# Patient Record
Sex: FEMALE | Race: BLACK OR AFRICAN AMERICAN | NOT HISPANIC OR LATINO | Employment: FULL TIME | URBAN - METROPOLITAN AREA
[De-identification: names, ages, dates, MRNs, and addresses within clinical notes are randomized per-mention and may not be internally consistent; named-entity substitution may affect disease eponyms.]

---

## 2023-12-14 ENCOUNTER — OFFICE VISIT (OUTPATIENT)
Dept: OBSTETRICS AND GYNECOLOGY | Facility: CLINIC | Age: 43
End: 2023-12-14
Payer: COMMERCIAL

## 2023-12-14 VITALS
HEART RATE: 86 BPM | DIASTOLIC BLOOD PRESSURE: 82 MMHG | WEIGHT: 211.8 LBS | HEIGHT: 64 IN | SYSTOLIC BLOOD PRESSURE: 119 MMHG | BODY MASS INDEX: 36.16 KG/M2

## 2023-12-14 DIAGNOSIS — Z11.3 SCREENING FOR STDS (SEXUALLY TRANSMITTED DISEASES): ICD-10-CM

## 2023-12-14 DIAGNOSIS — Z01.419 VISIT FOR GYNECOLOGIC EXAMINATION: Primary | ICD-10-CM

## 2023-12-14 DIAGNOSIS — Z12.4 CERVICAL CANCER SCREENING: ICD-10-CM

## 2023-12-14 PROCEDURE — 1036F TOBACCO NON-USER: CPT | Performed by: ADVANCED PRACTICE MIDWIFE

## 2023-12-14 PROCEDURE — 88175 CYTOPATH C/V AUTO FLUID REDO: CPT | Mod: TC | Performed by: ADVANCED PRACTICE MIDWIFE

## 2023-12-14 PROCEDURE — 99396 PREV VISIT EST AGE 40-64: CPT | Performed by: ADVANCED PRACTICE MIDWIFE

## 2023-12-14 PROCEDURE — 87661 TRICHOMONAS VAGINALIS AMPLIF: CPT | Mod: 59 | Performed by: ADVANCED PRACTICE MIDWIFE

## 2023-12-14 PROCEDURE — 87624 HPV HI-RISK TYP POOLED RSLT: CPT | Performed by: ADVANCED PRACTICE MIDWIFE

## 2023-12-14 PROCEDURE — 87800 DETECT AGNT MULT DNA DIREC: CPT | Performed by: ADVANCED PRACTICE MIDWIFE

## 2023-12-14 RX ORDER — FERROUS SULFATE, DRIED 160(50) MG
1 TABLET, EXTENDED RELEASE ORAL DAILY
COMMUNITY

## 2023-12-14 RX ORDER — MULTIVIT-MIN/IRON/FOLIC ACID/K 18-600-40
CAPSULE ORAL
COMMUNITY
Start: 2020-06-22 | End: 2023-12-14 | Stop reason: ALTCHOICE

## 2023-12-14 RX ORDER — UBIDECARENONE 75 MG
500 CAPSULE ORAL DAILY
COMMUNITY

## 2023-12-14 RX ORDER — BISMUTH SUBSALICYLATE 262 MG
1 TABLET,CHEWABLE ORAL DAILY
COMMUNITY

## 2023-12-14 RX ORDER — ALBUTEROL SULFATE 0.83 MG/ML
SOLUTION RESPIRATORY (INHALATION)
COMMUNITY
Start: 2015-09-02 | End: 2024-04-26 | Stop reason: ALTCHOICE

## 2023-12-14 ASSESSMENT — PATIENT HEALTH QUESTIONNAIRE - PHQ9
SUM OF ALL RESPONSES TO PHQ9 QUESTIONS 1 AND 2: 0
1. LITTLE INTEREST OR PLEASURE IN DOING THINGS: NOT AT ALL
2. FEELING DOWN, DEPRESSED OR HOPELESS: NOT AT ALL

## 2023-12-14 ASSESSMENT — PAIN SCALES - GENERAL: PAINLEVEL: 0-NO PAIN

## 2023-12-14 NOTE — PROGRESS NOTES
"Subjective   Quincy Dolan is a 43 y.o. female who is here for a routine exam. Periods are regular every 28-30 days, lasting 4 days. Dysmenorrhea:none. Cyclic symptoms include none. No intermenstrual bleeding, spotting, or discharge.    Current contraception: none  History of abnormal Pap smear: no  Family history of uterine or ovarian cancer: no  Regular self breast exam: yes  History of abnormal mammogram: no  Family history of breast cancer: no  History of abnormal lipids: no  Menstrual History:  OB History          4    Para   4    Term   4            AB        Living   4         SAB        IAB        Ectopic        Multiple        Live Births   4                  Patient's last menstrual period was 2023 (exact date).         Review of Systems    Objective   /82   Pulse 86   Ht 1.626 m (5' 4\")   Wt 96.1 kg (211 lb 12.8 oz)   LMP 2023 (Exact Date)   BMI 36.36 kg/m²     Physical Exam  Vitals reviewed.   Constitutional:       General: She is not in acute distress.     Appearance: Normal appearance.   HENT:      Head: Normocephalic.   Eyes:      Pupils: Pupils are equal, round, and reactive to light.   Cardiovascular:      Rate and Rhythm: Normal rate and regular rhythm.      Heart sounds: No murmur heard.     No gallop.   Pulmonary:      Effort: Pulmonary effort is normal. No respiratory distress.      Breath sounds: Normal breath sounds. No stridor. No wheezing, rhonchi or rales.   Chest:      Chest wall: No tenderness or crepitus.   Breasts:     Right: No inverted nipple, mass, nipple discharge, skin change or tenderness.      Left: Normal. No inverted nipple, mass, nipple discharge, skin change or tenderness.   Abdominal:      General: Abdomen is flat.      Palpations: Abdomen is soft. There is no mass.      Tenderness: There is no abdominal tenderness. There is no right CVA tenderness, left CVA tenderness or rebound.      Hernia: No hernia is present. "   Genitourinary:     General: Normal vulva.      Pubic Area: No rash.       Labia:         Right: No rash, tenderness, lesion or injury.         Left: No rash, tenderness, lesion or injury.       Urethra: No prolapse or urethral swelling.      Vagina: Normal. No foreign body. No erythema, tenderness, bleeding, lesions or prolapsed vaginal walls.      Cervix: No cervical motion tenderness, friability or lesion.      Uterus: Normal. Not enlarged, not tender and no uterine prolapse.       Adnexa: Right adnexa normal and left adnexa normal.        Right: No mass or tenderness.          Left: No mass or tenderness.        Rectum: Normal.      Comments: EGBUS WNL   Musculoskeletal:      Cervical back: Neck supple. No rigidity or tenderness.   Skin:     General: Skin is warm and dry.   Neurological:      Mental Status: She is alert.   Psychiatric:         Mood and Affect: Mood normal.         Behavior: Behavior normal.         Thought Content: Thought content normal.         Judgment: Judgment normal.        Assessment/Plan   Problem List Items Addressed This Visit    None  Visit Diagnoses       Visit for gynecologic examination    -  Primary    Relevant Orders    CBC    Hemoglobin A1c    TSH with reflex to Free T4 if abnormal    BI mammo bilateral screening tomosynthesis    Cervical cancer screening        Relevant Orders    THINPREP PAP TEST    Screening for STDs (sexually transmitted diseases)        Relevant Orders    Hepatitis C Antibody    HIV-1 and HIV-2 antibodies    Syphilis Screen with Reflex             All questions answered.  Await pap smear results.  Blood tests: CBC with diff, TSH, and STI testing, A1c.  Breast self exam technique reviewed and patient encouraged to perform self-exam monthly.  Discussed healthy lifestyle modifications.  Mammogram.  Thin prep Pap smear.  Reviewed perimenopause health, HT and symptoms that may arise   .    Return 1yr or sooner  Julia HUTCHINS CNM

## 2023-12-16 LAB
C TRACH RRNA SPEC QL NAA+PROBE: NEGATIVE
N GONORRHOEA DNA SPEC QL PROBE+SIG AMP: NEGATIVE
T VAGINALIS RRNA SPEC QL NAA+PROBE: NEGATIVE

## 2023-12-20 ENCOUNTER — HOSPITAL ENCOUNTER (OUTPATIENT)
Dept: RADIOLOGY | Facility: HOSPITAL | Age: 43
Discharge: HOME | End: 2023-12-20
Payer: COMMERCIAL

## 2023-12-20 DIAGNOSIS — Z01.419 VISIT FOR GYNECOLOGIC EXAMINATION: ICD-10-CM

## 2023-12-20 PROCEDURE — 77063 BREAST TOMOSYNTHESIS BI: CPT

## 2023-12-20 PROCEDURE — 77063 BREAST TOMOSYNTHESIS BI: CPT | Performed by: RADIOLOGY

## 2023-12-20 PROCEDURE — 77067 SCR MAMMO BI INCL CAD: CPT | Performed by: RADIOLOGY

## 2023-12-27 ENCOUNTER — LAB (OUTPATIENT)
Dept: LAB | Facility: LAB | Age: 43
End: 2023-12-27
Payer: COMMERCIAL

## 2023-12-27 DIAGNOSIS — Z11.3 SCREENING FOR STDS (SEXUALLY TRANSMITTED DISEASES): ICD-10-CM

## 2023-12-27 DIAGNOSIS — Z01.419 VISIT FOR GYNECOLOGIC EXAMINATION: ICD-10-CM

## 2023-12-27 LAB
CHOLEST SERPL-MCNC: 189 MG/DL (ref 0–199)
CHOLESTEROL/HDL RATIO: 3.2
ERYTHROCYTE [DISTWIDTH] IN BLOOD BY AUTOMATED COUNT: 17.7 % (ref 11.5–14.5)
EST. AVERAGE GLUCOSE BLD GHB EST-MCNC: 111 MG/DL
HBA1C MFR BLD: 5.5 %
HBV SURFACE AG SERPL QL IA: NONREACTIVE
HCT VFR BLD AUTO: 29.7 % (ref 36–46)
HCV AB SER QL: NONREACTIVE
HDLC SERPL-MCNC: 59.9 MG/DL
HGB BLD-MCNC: 8.8 G/DL (ref 12–16)
HIV 1+2 AB+HIV1 P24 AG SERPL QL IA: NONREACTIVE
MCH RBC QN AUTO: 20.3 PG (ref 26–34)
MCHC RBC AUTO-ENTMCNC: 29.6 G/DL (ref 32–36)
MCV RBC AUTO: 68 FL (ref 80–100)
NON-HDL CHOLESTEROL: 129 MG/DL (ref 0–149)
NRBC BLD-RTO: 0 /100 WBCS (ref 0–0)
PLATELET # BLD AUTO: 323 X10*3/UL (ref 150–450)
RBC # BLD AUTO: 4.34 X10*6/UL (ref 4–5.2)
T PALLIDUM AB SER QL: NONREACTIVE
TSH SERPL-ACNC: 1.8 MIU/L (ref 0.44–3.98)
WBC # BLD AUTO: 6.2 X10*3/UL (ref 4.4–11.3)

## 2023-12-27 PROCEDURE — 83718 ASSAY OF LIPOPROTEIN: CPT

## 2023-12-27 PROCEDURE — 82465 ASSAY BLD/SERUM CHOLESTEROL: CPT

## 2023-12-27 PROCEDURE — 86803 HEPATITIS C AB TEST: CPT

## 2023-12-27 PROCEDURE — 36415 COLL VENOUS BLD VENIPUNCTURE: CPT

## 2023-12-27 PROCEDURE — 87389 HIV-1 AG W/HIV-1&-2 AB AG IA: CPT

## 2023-12-27 PROCEDURE — 83036 HEMOGLOBIN GLYCOSYLATED A1C: CPT

## 2023-12-27 PROCEDURE — 87340 HEPATITIS B SURFACE AG IA: CPT

## 2023-12-27 PROCEDURE — 84443 ASSAY THYROID STIM HORMONE: CPT

## 2023-12-27 PROCEDURE — 86780 TREPONEMA PALLIDUM: CPT

## 2023-12-27 PROCEDURE — 85027 COMPLETE CBC AUTOMATED: CPT

## 2023-12-29 ENCOUNTER — PHARMACY VISIT (OUTPATIENT)
Dept: PHARMACY | Facility: CLINIC | Age: 43
End: 2023-12-29
Payer: MEDICAID

## 2023-12-29 ENCOUNTER — OFFICE VISIT (OUTPATIENT)
Dept: PRIMARY CARE | Facility: CLINIC | Age: 43
End: 2023-12-29
Payer: COMMERCIAL

## 2023-12-29 ENCOUNTER — LAB (OUTPATIENT)
Dept: LAB | Facility: LAB | Age: 43
End: 2023-12-29
Payer: COMMERCIAL

## 2023-12-29 VITALS
OXYGEN SATURATION: 99 % | HEIGHT: 64 IN | DIASTOLIC BLOOD PRESSURE: 77 MMHG | HEART RATE: 90 BPM | TEMPERATURE: 97.9 F | WEIGHT: 214 LBS | SYSTOLIC BLOOD PRESSURE: 122 MMHG | BODY MASS INDEX: 36.54 KG/M2

## 2023-12-29 DIAGNOSIS — Z90.3 H/O GASTRIC SLEEVE: ICD-10-CM

## 2023-12-29 DIAGNOSIS — K59.03 DRUG-INDUCED CONSTIPATION: ICD-10-CM

## 2023-12-29 DIAGNOSIS — D50.8 IRON DEFICIENCY ANEMIA AFTER GASTRECTOMY: Primary | ICD-10-CM

## 2023-12-29 DIAGNOSIS — K91.89 IRON DEFICIENCY ANEMIA AFTER GASTRECTOMY: Primary | ICD-10-CM

## 2023-12-29 DIAGNOSIS — D50.9 IRON DEFICIENCY ANEMIA, UNSPECIFIED IRON DEFICIENCY ANEMIA TYPE: ICD-10-CM

## 2023-12-29 LAB
ALBUMIN SERPL BCP-MCNC: 3.9 G/DL (ref 3.4–5)
ALP SERPL-CCNC: 84 U/L (ref 33–110)
ALT SERPL W P-5'-P-CCNC: 9 U/L (ref 7–45)
ANION GAP SERPL CALC-SCNC: 12 MMOL/L (ref 10–20)
AST SERPL W P-5'-P-CCNC: 12 U/L (ref 9–39)
BILIRUB SERPL-MCNC: 0.6 MG/DL (ref 0–1.2)
BUN SERPL-MCNC: 10 MG/DL (ref 6–23)
CALCIUM SERPL-MCNC: 9.5 MG/DL (ref 8.6–10.6)
CHLORIDE SERPL-SCNC: 105 MMOL/L (ref 98–107)
CO2 SERPL-SCNC: 29 MMOL/L (ref 21–32)
CREAT SERPL-MCNC: 0.85 MG/DL (ref 0.5–1.05)
GFR SERPL CREATININE-BSD FRML MDRD: 87 ML/MIN/1.73M*2
GLUCOSE SERPL-MCNC: 76 MG/DL (ref 74–99)
POTASSIUM SERPL-SCNC: 4.2 MMOL/L (ref 3.5–5.3)
PROT SERPL-MCNC: 6.8 G/DL (ref 6.4–8.2)
SODIUM SERPL-SCNC: 142 MMOL/L (ref 136–145)

## 2023-12-29 PROCEDURE — 99203 OFFICE O/P NEW LOW 30 MIN: CPT | Performed by: STUDENT IN AN ORGANIZED HEALTH CARE EDUCATION/TRAINING PROGRAM

## 2023-12-29 PROCEDURE — 1036F TOBACCO NON-USER: CPT | Performed by: STUDENT IN AN ORGANIZED HEALTH CARE EDUCATION/TRAINING PROGRAM

## 2023-12-29 PROCEDURE — 80053 COMPREHEN METABOLIC PANEL: CPT

## 2023-12-29 PROCEDURE — 36415 COLL VENOUS BLD VENIPUNCTURE: CPT

## 2023-12-29 PROCEDURE — RXMED WILLOW AMBULATORY MEDICATION CHARGE

## 2023-12-29 RX ORDER — CHOLECALCIFEROL (VITAMIN D3) 1250 MCG
TABLET ORAL WEEKLY
COMMUNITY
Start: 2020-01-23 | End: 2023-12-29 | Stop reason: ALTCHOICE

## 2023-12-29 RX ORDER — FERROUS SULFATE 325(65) MG
325 TABLET ORAL EVERY OTHER DAY
Qty: 15 TABLET | Refills: 11 | Status: SHIPPED | OUTPATIENT
Start: 2023-12-29 | End: 2024-12-28

## 2023-12-29 RX ORDER — AMOXICILLIN 250 MG
1 CAPSULE ORAL DAILY PRN
Qty: 30 TABLET | Refills: 11 | Status: SHIPPED | OUTPATIENT
Start: 2023-12-29 | End: 2024-12-28

## 2023-12-29 ASSESSMENT — PAIN SCALES - GENERAL: PAINLEVEL: 0-NO PAIN

## 2023-12-29 NOTE — PROGRESS NOTES
"Subjective   Patient ID: Quincy Dolan is a 43 y.o. female who presents for Establish Care (Pt has concerns about her hemoglobin ).    HPI    #Anemia  - last Hbg 8.8 from blood work on 12/27, downtrend from prior years  - menses regular, not heavy  - no coughing up blood, blood in stool or urine  - G4- not anemic during pregnancy, all c-sections  - no blood disorders in family  - no colon cancer history in family, no concerns about bowel movements   - personal history of gastric sleeve surgery, right breast cystectomy, right oophrectomy  - gastric sleeve surgery occurred in 2/2021 with  Bariatrics  - does endorse increases fatigue and feeling cold easily      Review of Systems   All other systems reviewed and are negative.    Objective   /77 (BP Location: Right arm, Patient Position: Sitting)   Pulse 90   Temp 36.6 °C (97.9 °F) (Temporal)   Ht 1.626 m (5' 4\")   Wt 97.1 kg (214 lb)   LMP 12/20/2023 (Exact Date)   SpO2 99%   BMI 36.73 kg/m²      Physical Exam  Constitutional:       General: She is not in acute distress.  HENT:      Head: Normocephalic and atraumatic.   Eyes:      Conjunctiva/sclera: Conjunctivae normal.   Cardiovascular:      Heart sounds: Normal heart sounds. No murmur heard.  Pulmonary:      Effort: Pulmonary effort is normal.      Breath sounds: Normal breath sounds.   Abdominal:      Palpations: Abdomen is soft.   Musculoskeletal:         General: Normal range of motion.   Neurological:      Mental Status: She is alert and oriented to person, place, and time.      Comments: CN 2-12 grossly intact   Psychiatric:         Mood and Affect: Mood normal.       Assessment/Plan   Diagnoses and all orders for this visit:  Iron deficiency anemia after gastrectomy  Comments:  New dx of microcytic anemia w/ hbg 8.8, MCV 68. Likely 2/2 gastrectomy (2/2021) affecting iron absorption. Started iron supplementation. Recheck CBC in 3 mo  Orders:  -     ferrous sulfate, 325 mg ferrous sulfate, " (FeroSuL) tablet; Take 1 tablet by mouth every other day.  H/O gastric sleeve  Comments:  2/2021 sleeve gastrectomy w/ [facility]. C/w daily Vit B12, Vit D,multivitamin. Ordered CMP for Ca levels. At next visit, consider Vit C to help absorb iron  Orders:  -     Comprehensive metabolic panel; Future  Drug-induced constipation  Comments:  Ordered stool softners in case iron tabs cause constipation for patient.  Orders:  -     sennosides-docusate sodium (Senokot-S) 8.6-50 mg tablet; Take 1 tablet by mouth once daily as needed for constipation.  Other orders  -     Follow Up In Primary Care - Established; Future    Follow up/Return to the clinic in 3 months    Attending Supervision: discussed with attending physician, Dr. Yossi Gage MD  Family Medicine, PGY-3

## 2024-01-01 PROBLEM — K59.03 DRUG-INDUCED CONSTIPATION: Status: ACTIVE | Noted: 2024-01-01

## 2024-01-01 PROBLEM — Z90.3 H/O GASTRIC SLEEVE: Status: ACTIVE | Noted: 2024-01-01

## 2024-01-01 PROBLEM — D50.9 IRON DEFICIENCY ANEMIA: Status: ACTIVE | Noted: 2024-01-01

## 2024-01-02 LAB
CYTOLOGY CMNT CVX/VAG CYTO-IMP: NORMAL
HPV HR 12 DNA GENITAL QL NAA+PROBE: NEGATIVE
HPV HR GENOTYPES PNL CVX NAA+PROBE: NEGATIVE
HPV16 DNA SPEC QL NAA+PROBE: NEGATIVE
HPV18 DNA SPEC QL NAA+PROBE: NEGATIVE
LAB AP HPV GENOTYPE QUESTION: YES
LAB AP HPV HR: NORMAL
LAB AP PAP ADDITIONAL TESTS: NORMAL
LABORATORY COMMENT REPORT: NORMAL
LMP START DATE: NORMAL
MENSTRUAL HX REPORTED: NORMAL
PATH REPORT.TOTAL CANCER: NORMAL

## 2024-01-02 NOTE — PROGRESS NOTES
I reviewed the resident/fellow's documentation and discussed the patient with the resident/fellow. I agree with the resident/fellow's medical decision making as documented in the note.    Negro Sy MD

## 2024-02-15 ENCOUNTER — PHARMACY VISIT (OUTPATIENT)
Dept: PHARMACY | Facility: CLINIC | Age: 44
End: 2024-02-15
Payer: MEDICAID

## 2024-02-15 PROCEDURE — RXMED WILLOW AMBULATORY MEDICATION CHARGE

## 2024-03-22 ENCOUNTER — APPOINTMENT (OUTPATIENT)
Dept: PRIMARY CARE | Facility: CLINIC | Age: 44
End: 2024-03-22
Payer: COMMERCIAL

## 2024-03-28 ENCOUNTER — PHARMACY VISIT (OUTPATIENT)
Dept: PHARMACY | Facility: CLINIC | Age: 44
End: 2024-03-28
Payer: MEDICAID

## 2024-03-28 PROCEDURE — RXMED WILLOW AMBULATORY MEDICATION CHARGE

## 2024-04-26 ENCOUNTER — OFFICE VISIT (OUTPATIENT)
Dept: PRIMARY CARE | Facility: CLINIC | Age: 44
End: 2024-04-26
Payer: COMMERCIAL

## 2024-04-26 ENCOUNTER — LAB (OUTPATIENT)
Dept: LAB | Facility: LAB | Age: 44
End: 2024-04-26
Payer: COMMERCIAL

## 2024-04-26 VITALS
BODY MASS INDEX: 37.22 KG/M2 | DIASTOLIC BLOOD PRESSURE: 73 MMHG | OXYGEN SATURATION: 97 % | WEIGHT: 218 LBS | HEIGHT: 64 IN | HEART RATE: 68 BPM | SYSTOLIC BLOOD PRESSURE: 107 MMHG | TEMPERATURE: 97.5 F

## 2024-04-26 DIAGNOSIS — Z90.3 H/O GASTRIC SLEEVE: ICD-10-CM

## 2024-04-26 DIAGNOSIS — K91.89 IRON DEFICIENCY ANEMIA AFTER GASTRECTOMY: ICD-10-CM

## 2024-04-26 DIAGNOSIS — D50.8 IRON DEFICIENCY ANEMIA AFTER GASTRECTOMY: Primary | ICD-10-CM

## 2024-04-26 DIAGNOSIS — D50.8 IRON DEFICIENCY ANEMIA AFTER GASTRECTOMY: ICD-10-CM

## 2024-04-26 DIAGNOSIS — K91.89 IRON DEFICIENCY ANEMIA AFTER GASTRECTOMY: Primary | ICD-10-CM

## 2024-04-26 PROBLEM — N89.8 VAGINAL DISCHARGE: Status: ACTIVE | Noted: 2024-04-26

## 2024-04-26 PROBLEM — E66.01 PSYCHOLOGICAL FACTORS AFFECTING MORBID OBESITY (MULTI): Status: ACTIVE | Noted: 2024-04-26

## 2024-04-26 PROBLEM — N39.0 URINARY TRACT INFECTION: Status: ACTIVE | Noted: 2024-04-26

## 2024-04-26 PROBLEM — E78.00 HYPERCHOLESTEROLEMIA: Status: ACTIVE | Noted: 2024-04-26

## 2024-04-26 PROBLEM — M77.52 TENDONITIS OF ANKLE, LEFT: Status: ACTIVE | Noted: 2024-04-26

## 2024-04-26 PROBLEM — H11.31 SUBCONJUNCTIVAL HEMORRHAGE OF RIGHT EYE: Status: ACTIVE | Noted: 2024-04-26

## 2024-04-26 PROBLEM — R11.2 POST-OPERATIVE NAUSEA AND VOMITING: Status: ACTIVE | Noted: 2024-04-26

## 2024-04-26 PROBLEM — Z98.84 BARIATRIC SURGERY STATUS: Status: ACTIVE | Noted: 2024-04-26

## 2024-04-26 PROBLEM — F54 PSYCHOLOGICAL FACTORS AFFECTING MORBID OBESITY (MULTI): Status: ACTIVE | Noted: 2024-04-26

## 2024-04-26 PROBLEM — S80.00XA KNEE CONTUSION: Status: ACTIVE | Noted: 2024-04-26

## 2024-04-26 PROBLEM — N83.209 OVARIAN CYST: Status: ACTIVE | Noted: 2024-04-26

## 2024-04-26 PROBLEM — R63.4 RAPID WEIGHT LOSS: Status: ACTIVE | Noted: 2024-04-26

## 2024-04-26 PROBLEM — L68.0 HIRSUTISM: Status: ACTIVE | Noted: 2024-04-26

## 2024-04-26 PROBLEM — R30.0 DYSURIA: Status: ACTIVE | Noted: 2024-04-26

## 2024-04-26 PROBLEM — B37.31 CANDIDIASIS OF VAGINA: Status: ACTIVE | Noted: 2024-04-26

## 2024-04-26 PROBLEM — R73.09 ELEVATED HEMOGLOBIN A1C: Status: ACTIVE | Noted: 2024-04-26

## 2024-04-26 PROBLEM — J20.9 ACUTE BRONCHITIS: Status: ACTIVE | Noted: 2024-04-26

## 2024-04-26 PROBLEM — D64.9 ANEMIA: Status: ACTIVE | Noted: 2024-04-26

## 2024-04-26 PROBLEM — S83.419A SPRAIN OF MEDIAL COLLATERAL LIGAMENT OF KNEE: Status: ACTIVE | Noted: 2024-04-26

## 2024-04-26 PROBLEM — R35.0 URINARY FREQUENCY: Status: ACTIVE | Noted: 2024-04-26

## 2024-04-26 PROBLEM — R29.818 SUSPECTED SLEEP APNEA: Status: ACTIVE | Noted: 2024-04-26

## 2024-04-26 PROBLEM — N63.0 BREAST LUMP: Status: ACTIVE | Noted: 2024-04-26

## 2024-04-26 PROBLEM — R06.83 SNORES: Status: ACTIVE | Noted: 2024-04-26

## 2024-04-26 PROBLEM — R73.03 PREDIABETES: Status: ACTIVE | Noted: 2024-04-26

## 2024-04-26 PROBLEM — L29.9 ITCHING: Status: ACTIVE | Noted: 2024-04-26

## 2024-04-26 PROBLEM — J06.9 VIRAL UPPER RESPIRATORY INFECTION: Status: ACTIVE | Noted: 2024-04-26

## 2024-04-26 PROBLEM — M54.30 SCIATICA: Status: ACTIVE | Noted: 2024-04-26

## 2024-04-26 PROBLEM — E66.9 OBESITY: Status: ACTIVE | Noted: 2024-04-26

## 2024-04-26 PROBLEM — S83.419A SPRAIN OF MCL JOINT OF KNEE: Status: ACTIVE | Noted: 2024-04-26

## 2024-04-26 PROBLEM — Z98.890 POST-OPERATIVE NAUSEA AND VOMITING: Status: ACTIVE | Noted: 2024-04-26

## 2024-04-26 PROBLEM — M17.9 KNEE OSTEOARTHRITIS: Status: ACTIVE | Noted: 2024-04-26

## 2024-04-26 PROBLEM — G89.18 ACUTE POST-OPERATIVE PAIN: Status: ACTIVE | Noted: 2024-04-26

## 2024-04-26 PROBLEM — K91.2 POSTOPERATIVE MALABSORPTION (HHS-HCC): Status: ACTIVE | Noted: 2024-04-26

## 2024-04-26 PROBLEM — E55.9 VITAMIN D DEFICIENCY: Status: ACTIVE | Noted: 2024-04-26

## 2024-04-26 PROBLEM — R53.83 FATIGUE: Status: ACTIVE | Noted: 2024-04-26

## 2024-04-26 LAB
ERYTHROCYTE [DISTWIDTH] IN BLOOD BY AUTOMATED COUNT: 18.8 % (ref 11.5–14.5)
FERRITIN SERPL-MCNC: 20 NG/ML (ref 8–150)
HCT VFR BLD AUTO: 33.9 % (ref 36–46)
HGB BLD-MCNC: 10.3 G/DL (ref 12–16)
IRON SATN MFR SERPL: 4 % (ref 25–45)
IRON SERPL-MCNC: 18 UG/DL (ref 35–150)
MCH RBC QN AUTO: 22.1 PG (ref 26–34)
MCHC RBC AUTO-ENTMCNC: 30.4 G/DL (ref 32–36)
MCV RBC AUTO: 73 FL (ref 80–100)
NRBC BLD-RTO: 0 /100 WBCS (ref 0–0)
PLATELET # BLD AUTO: 337 X10*3/UL (ref 150–450)
RBC # BLD AUTO: 4.67 X10*6/UL (ref 4–5.2)
TIBC SERPL-MCNC: 421 UG/DL (ref 240–445)
UIBC SERPL-MCNC: 403 UG/DL (ref 110–370)
WBC # BLD AUTO: 7.3 X10*3/UL (ref 4.4–11.3)

## 2024-04-26 PROCEDURE — 85027 COMPLETE CBC AUTOMATED: CPT

## 2024-04-26 PROCEDURE — 99213 OFFICE O/P EST LOW 20 MIN: CPT | Performed by: STUDENT IN AN ORGANIZED HEALTH CARE EDUCATION/TRAINING PROGRAM

## 2024-04-26 PROCEDURE — 83540 ASSAY OF IRON: CPT

## 2024-04-26 PROCEDURE — 36415 COLL VENOUS BLD VENIPUNCTURE: CPT

## 2024-04-26 PROCEDURE — 83550 IRON BINDING TEST: CPT

## 2024-04-26 PROCEDURE — 82728 ASSAY OF FERRITIN: CPT

## 2024-04-26 ASSESSMENT — PAIN SCALES - GENERAL: PAINLEVEL: 0-NO PAIN

## 2024-04-26 NOTE — PROGRESS NOTES
"Subjective   Patient ID: Quincy Dolan is a 43 y.o. female who presents for Follow-up.    HPI    #Iron Deficiency Anemia  #h/o gastric sleeve  - taking iron supplements every other day without side effects  - noticed increase in energy  - no coughing up blood, no blood in stools  - denies any constipation from the meds, states she never needed the stool softner  - denies any other concerns      Review of Systems   All other systems reviewed and are negative.      Objective   /73 (BP Location: Right arm, Patient Position: Sitting, BP Cuff Size: Adult)   Pulse 68   Temp 36.4 °C (97.5 °F) (Temporal)   Ht 1.626 m (5' 4\")   Wt 98.9 kg (218 lb)   SpO2 97%   BMI 37.42 kg/m²      Physical Exam  Constitutional:       General: She is not in acute distress.  HENT:      Head: Normocephalic and atraumatic.   Eyes:      Conjunctiva/sclera: Conjunctivae normal.   Cardiovascular:      Heart sounds: Normal heart sounds. No murmur heard.  Pulmonary:      Effort: Pulmonary effort is normal.      Breath sounds: Normal breath sounds.   Abdominal:      Palpations: Abdomen is soft.   Musculoskeletal:         General: Normal range of motion.   Neurological:      Mental Status: She is alert and oriented to person, place, and time.      Comments: CN 2-12 grossly intact   Psychiatric:         Mood and Affect: Mood normal.      Assessment/Plan   Diagnoses and all orders for this visit:  Iron deficiency anemia after gastrectomy  Comments:  MARCELINO 2/2 gastrectomy, improved symptomatically, Will get repeat CBC w/ iron studies for further eval.  Orders:  -     Follow Up In Primary Care - Established  -     CBC; Future  -     Iron and TIBC; Future  -     Ferritin; Future  H/O gastric sleeve  Comments:  2/2021 sleeve gastrectomy w/ [facility]. C/w daily Vit B12, Vit D,multivitamin, and iron.    Follow up/Return to the clinic in 6 months    Attending Supervision: discussed with attending physician, Dr. Víctor Gage, " MD  Family Medicine, PGY-3

## 2024-05-04 NOTE — RESULT ENCOUNTER NOTE
Spoke w/ pt over the phone. Results show improvement in Hbg to 10 from 8. Also improvement in ferritin to normal at 20. However iron level and percent saturation still low. Advised pt to continue iron supplementation for another 3-4 months to continue improving her iron deficiency anemia.

## 2024-05-09 PROBLEM — R73.09 ELEVATED HEMOGLOBIN A1C: Status: RESOLVED | Noted: 2024-04-26 | Resolved: 2024-05-09

## 2024-05-09 PROBLEM — G89.18 ACUTE POST-OPERATIVE PAIN: Status: RESOLVED | Noted: 2024-04-26 | Resolved: 2024-05-09

## 2024-05-09 PROBLEM — D64.9 ANEMIA: Status: RESOLVED | Noted: 2024-04-26 | Resolved: 2024-05-09

## 2024-05-09 PROBLEM — B37.31 CANDIDIASIS OF VAGINA: Status: RESOLVED | Noted: 2024-04-26 | Resolved: 2024-05-09

## 2024-05-09 PROBLEM — R35.0 URINARY FREQUENCY: Status: RESOLVED | Noted: 2024-04-26 | Resolved: 2024-05-09

## 2024-05-09 PROBLEM — R30.0 DYSURIA: Status: RESOLVED | Noted: 2024-04-26 | Resolved: 2024-05-09

## 2024-05-09 PROBLEM — N39.0 URINARY TRACT INFECTION: Status: RESOLVED | Noted: 2024-04-26 | Resolved: 2024-05-09

## 2024-05-09 PROBLEM — Z98.890 POST-OPERATIVE NAUSEA AND VOMITING: Status: RESOLVED | Noted: 2024-04-26 | Resolved: 2024-05-09

## 2024-05-09 PROBLEM — R53.83 FATIGUE: Status: RESOLVED | Noted: 2024-04-26 | Resolved: 2024-05-09

## 2024-05-09 PROBLEM — E66.9 OBESITY: Status: RESOLVED | Noted: 2024-04-26 | Resolved: 2024-05-09

## 2024-05-09 PROBLEM — J06.9 VIRAL UPPER RESPIRATORY INFECTION: Status: RESOLVED | Noted: 2024-04-26 | Resolved: 2024-05-09

## 2024-05-09 PROBLEM — L29.9 ITCHING: Status: RESOLVED | Noted: 2024-04-26 | Resolved: 2024-05-09

## 2024-05-09 PROBLEM — D50.8 IRON DEFICIENCY ANEMIA AFTER GASTRECTOMY: Status: ACTIVE | Noted: 2024-01-01

## 2024-05-09 PROBLEM — F54 PSYCHOLOGICAL FACTORS AFFECTING MORBID OBESITY (MULTI): Status: RESOLVED | Noted: 2024-04-26 | Resolved: 2024-05-09

## 2024-05-09 PROBLEM — N89.8 VAGINAL DISCHARGE: Status: RESOLVED | Noted: 2024-04-26 | Resolved: 2024-05-09

## 2024-05-09 PROBLEM — R63.4 RAPID WEIGHT LOSS: Status: RESOLVED | Noted: 2024-04-26 | Resolved: 2024-05-09

## 2024-05-09 PROBLEM — K91.89 IRON DEFICIENCY ANEMIA AFTER GASTRECTOMY: Status: ACTIVE | Noted: 2024-01-01

## 2024-05-09 PROBLEM — N63.0 BREAST LUMP: Status: RESOLVED | Noted: 2024-04-26 | Resolved: 2024-05-09

## 2024-05-09 PROBLEM — R73.03 PREDIABETES: Status: RESOLVED | Noted: 2024-04-26 | Resolved: 2024-05-09

## 2024-05-09 PROBLEM — R06.83 SNORES: Status: RESOLVED | Noted: 2024-04-26 | Resolved: 2024-05-09

## 2024-05-09 PROBLEM — E66.01 PSYCHOLOGICAL FACTORS AFFECTING MORBID OBESITY (MULTI): Status: RESOLVED | Noted: 2024-04-26 | Resolved: 2024-05-09

## 2024-05-09 PROBLEM — S80.00XA KNEE CONTUSION: Status: RESOLVED | Noted: 2024-04-26 | Resolved: 2024-05-09

## 2024-05-09 PROBLEM — R11.2 POST-OPERATIVE NAUSEA AND VOMITING: Status: RESOLVED | Noted: 2024-04-26 | Resolved: 2024-05-09

## 2024-05-10 ENCOUNTER — PHARMACY VISIT (OUTPATIENT)
Dept: PHARMACY | Facility: CLINIC | Age: 44
End: 2024-05-10
Payer: MEDICAID

## 2024-05-10 PROCEDURE — RXMED WILLOW AMBULATORY MEDICATION CHARGE

## 2024-05-24 NOTE — PROGRESS NOTES
I reviewed the resident/fellow's documentation and discussed the patient with the resident/fellow. I agree with the resident/fellow's medical decision making as documented in the note.    Karolina Renee MD

## 2024-06-18 ENCOUNTER — PHARMACY VISIT (OUTPATIENT)
Dept: PHARMACY | Facility: CLINIC | Age: 44
End: 2024-06-18
Payer: MEDICAID

## 2024-06-18 PROCEDURE — RXMED WILLOW AMBULATORY MEDICATION CHARGE

## 2024-08-20 ENCOUNTER — PHARMACY VISIT (OUTPATIENT)
Dept: PHARMACY | Facility: CLINIC | Age: 44
End: 2024-08-20
Payer: COMMERCIAL

## 2024-08-20 PROCEDURE — RXMED WILLOW AMBULATORY MEDICATION CHARGE

## 2024-10-08 ENCOUNTER — PHARMACY VISIT (OUTPATIENT)
Dept: PHARMACY | Facility: CLINIC | Age: 44
End: 2024-10-08
Payer: COMMERCIAL

## 2024-10-08 PROCEDURE — RXMED WILLOW AMBULATORY MEDICATION CHARGE

## 2024-11-08 ENCOUNTER — PHARMACY VISIT (OUTPATIENT)
Dept: PHARMACY | Facility: CLINIC | Age: 44
End: 2024-11-08
Payer: COMMERCIAL

## 2024-11-08 PROCEDURE — RXMED WILLOW AMBULATORY MEDICATION CHARGE

## 2024-12-10 ENCOUNTER — PHARMACY VISIT (OUTPATIENT)
Dept: PHARMACY | Facility: CLINIC | Age: 44
End: 2024-12-10
Payer: COMMERCIAL

## 2024-12-10 PROCEDURE — RXMED WILLOW AMBULATORY MEDICATION CHARGE
